# Patient Record
Sex: FEMALE | Race: WHITE | NOT HISPANIC OR LATINO | Employment: OTHER | URBAN - METROPOLITAN AREA
[De-identification: names, ages, dates, MRNs, and addresses within clinical notes are randomized per-mention and may not be internally consistent; named-entity substitution may affect disease eponyms.]

---

## 2022-08-24 PROBLEM — E04.1 THYROID NODULE: Status: ACTIVE | Noted: 2021-05-06

## 2022-08-24 PROBLEM — I10 ESSENTIAL (PRIMARY) HYPERTENSION: Status: ACTIVE | Noted: 2021-02-24

## 2022-08-24 PROBLEM — F41.9 ANXIETY: Status: ACTIVE | Noted: 2022-08-24

## 2022-08-24 PROBLEM — E03.9 HYPOTHYROIDISM: Status: ACTIVE | Noted: 2020-11-17

## 2022-08-24 PROBLEM — E78.5 HYPERLIPIDEMIA: Status: ACTIVE | Noted: 2020-11-17

## 2022-08-24 PROBLEM — R51.9 HEADACHE, UNSPECIFIED: Status: ACTIVE | Noted: 2022-03-22

## 2022-10-20 PROBLEM — N92.0 MENORRHAGIA: Status: ACTIVE | Noted: 2022-10-20

## 2022-10-20 PROBLEM — N91.2 AMENORRHEA: Status: ACTIVE | Noted: 2022-10-20

## 2022-12-27 PROBLEM — E04.2 MULTINODULAR THYROID: Status: ACTIVE | Noted: 2022-12-27

## 2022-12-28 PROBLEM — R00.0 TACHYCARDIA: Status: ACTIVE | Noted: 2022-12-28

## 2022-12-28 PROBLEM — R00.2 PALPITATIONS: Status: ACTIVE | Noted: 2022-12-28

## 2022-12-28 PROBLEM — J30.9 ALLERGIC RHINITIS: Status: ACTIVE | Noted: 2022-12-28

## 2022-12-28 PROBLEM — H57.12 PAIN OF LEFT EYE: Status: ACTIVE | Noted: 2022-12-28

## 2022-12-28 PROBLEM — Z82.49 FAMILY HISTORY OF ISCHEMIC HEART DISEASE: Status: ACTIVE | Noted: 2022-12-28

## 2022-12-28 PROBLEM — R07.89 OTHER CHEST PAIN: Status: ACTIVE | Noted: 2022-12-28

## 2022-12-28 PROBLEM — E66.01 CLASS 2 SEVERE OBESITY WITH SERIOUS COMORBIDITY IN ADULT: Status: ACTIVE | Noted: 2022-12-28

## 2023-04-25 PROBLEM — H91.93 BILATERAL HEARING LOSS: Status: ACTIVE | Noted: 2023-04-25

## 2023-04-25 PROBLEM — Z86.69 HISTORY OF FREQUENT EAR INFECTIONS: Status: ACTIVE | Noted: 2023-04-25

## 2024-01-05 ENCOUNTER — PATIENT MESSAGE (OUTPATIENT)
Dept: URGENT CARE | Facility: CLINIC | Age: 34
End: 2024-01-05
Payer: MEDICAID

## 2024-05-31 ENCOUNTER — ON-DEMAND VIRTUAL (OUTPATIENT)
Dept: URGENT CARE | Facility: CLINIC | Age: 34
End: 2024-05-31
Payer: MEDICAID

## 2024-05-31 DIAGNOSIS — R11.2 NAUSEA AND VOMITING, UNSPECIFIED VOMITING TYPE: Primary | ICD-10-CM

## 2024-05-31 PROCEDURE — 99212 OFFICE O/P EST SF 10 MIN: CPT | Mod: 95,,, | Performed by: FAMILY MEDICINE

## 2024-05-31 RX ORDER — ONDANSETRON 4 MG/1
8 TABLET, ORALLY DISINTEGRATING ORAL EVERY 8 HOURS PRN
Qty: 20 TABLET | Refills: 0 | Status: SHIPPED | OUTPATIENT
Start: 2024-05-31

## 2024-05-31 NOTE — PROGRESS NOTES
Subjective:      Patient ID: Chanelle Hunter is a 33 y.o. female.    Vitals:  vitals were not taken for this visit.     Chief Complaint: Nausea (Nausea and vomiting)      Visit Type: TELE AUDIOVISUAL    Present with the patient at the time of consultation: TELEMED PRESENT WITH PATIENT: None    Past Medical History:   Diagnosis Date    Allergy     Anxiety     Depression     Hyperlipidemia 2020    Hypertension     Multinodular thyroid 2022    Benign-appearing multinodular gland.  Recommend 1 year follow-up.    PCOS (polycystic ovarian syndrome)     Tachycardia     Thyroid disease      Past Surgical History:   Procedure Laterality Date     SECTION      FOOT SURGERY Bilateral     HAND SURGERY Bilateral     WISDOM TOOTH EXTRACTION       Review of patient's allergies indicates:   Allergen Reactions    Latex, natural rubber Hives    Nuts [tree nut] Hives, Shortness Of Breath and Swelling    Cat dander Hives     rash    Dog dander Hives     rash    Grass pollen- grass standard Hives     rash    Latex      Other Reaction(s): Not available     Current Outpatient Medications on File Prior to Visit   Medication Sig Dispense Refill    albuterol (PROVENTIL) 2.5 mg /3 mL (0.083 %) nebulizer solution SMARTSI Vial(s) By Mouth 4 Times Daily PRN      buPROPion (WELLBUTRIN XL) 300 MG 24 hr tablet Take 1 tablet (300 mg total) by mouth every morning. 90 tablet 3    EPINEPHrine (EPIPEN) 0.3 mg/0.3 mL AtIn Apply topically.      fluticasone propionate (FLONASE) 50 mcg/actuation nasal spray 2 sprays (100 mcg total) by Each Nostril route 2 (two) times daily. 16 g 11    labetaloL (NORMODYNE) 100 MG tablet Take 1 tablet (100 mg total) by mouth every 12 (twelve) hours. 180 tablet 3    levothyroxine (SYNTHROID) 88 MCG tablet Take 1 tablet (88 mcg total) by mouth once daily. 90 tablet 3     No current facility-administered medications on file prior to visit.     Family History   Problem Relation Name Age of Onset     Hypertension Mother      Diabetes Father      Hypertension Father      Diabetes Paternal Grandfather      Hypertension Paternal Grandfather      Cancer Paternal Grandfather      Diabetes Paternal Grandmother      Hypertension Paternal Grandmother      Diabetes Maternal Grandmother      Hypertension Maternal Grandmother      Stroke Maternal Grandmother      Diabetes Maternal Grandfather      Hypertension Maternal Grandfather      Heart disease Maternal Grandfather      Cancer Maternal Aunt         Medications Ordered                [facility] - [place], [place] - [address]   [address] , [place] [address]    Telephone: [phone]   Fax: [phone]   Hours: Not open 24 hours                         E-Prescribed (1 of 1)              ondansetron (ZOFRAN-ODT) 4 MG TbDL    Sig: Take 2 tablets (8 mg total) by mouth every 8 (eight) hours as needed (nausea and vomiting).       Start: 5/31/24     Quantity: 20 tablet Refills: 0                           Ohs Peq Odvv Intake    5/31/2024  1:29 PM CDT - Filed by Patient   What is your current physical address in the event of a medical emergency? [facility] [address] [facility] [address]   Are you able to take your vital signs? Yes   Systolic Blood Pressure: 110   Diastolic Blood Pressure: 82   Weight: 249   Height: 68   Pulse: 95   Temperature: 98   Respiration rate: 12   Pulse Oxygen: 100   Please attach any relevant images or files          34 yo female with nausea and vomiting since yesterday.  Suspects she may have picked something up from the  she runs.    Nausea  Associated symptoms include nausea.     Gastrointestinal:  Positive for nausea.      Objective:   The physical exam was conducted virtually.  Physical Exam   Constitutional: She is oriented to person, place, and time. She does not appear ill. No distress.   Pulmonary/Chest: Effort normal. No respiratory distress.   Neurological: She is alert and oriented to person, place, and  time.     Assessment:     1. Nausea and vomiting, unspecified vomiting type        Plan:       Nausea and vomiting, unspecified vomiting type  -     ondansetron (ZOFRAN-ODT) 4 MG TbDL; Take 2 tablets (8 mg total) by mouth every 8 (eight) hours as needed (nausea and vomiting).  Dispense: 20 tablet; Refill: 0      Please take your nausea medicine before you take your chronic medications tomorrow.